# Patient Record
Sex: FEMALE | Race: BLACK OR AFRICAN AMERICAN | NOT HISPANIC OR LATINO | Employment: UNEMPLOYED | URBAN - METROPOLITAN AREA
[De-identification: names, ages, dates, MRNs, and addresses within clinical notes are randomized per-mention and may not be internally consistent; named-entity substitution may affect disease eponyms.]

---
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
## 2023-05-09 LAB
EXTERNAL GC/CHLAMYDIA: NEGATIVE
EXTERNAL GC/CHLAMYDIA: NEGATIVE
T VAGINALIS DNA VAG QL NAA+PROBE: NOT DETECTED
T VAGINALIS DNA VAG QL NAA+PROBE: NOT DETECTED

## 2024-01-24 ENCOUNTER — OFFICE VISIT (OUTPATIENT)
Dept: FAMILY MEDICINE CLINIC | Facility: CLINIC | Age: 24
End: 2024-01-24
Payer: MEDICAID

## 2024-01-24 VITALS
WEIGHT: 290.6 LBS | DIASTOLIC BLOOD PRESSURE: 78 MMHG | HEART RATE: 86 BPM | RESPIRATION RATE: 16 BRPM | BODY MASS INDEX: 46.7 KG/M2 | HEIGHT: 66 IN | TEMPERATURE: 97.5 F | SYSTOLIC BLOOD PRESSURE: 124 MMHG | OXYGEN SATURATION: 97 %

## 2024-01-24 DIAGNOSIS — F33.1 MODERATE EPISODE OF RECURRENT MAJOR DEPRESSIVE DISORDER: ICD-10-CM

## 2024-01-24 DIAGNOSIS — B37.2 CANDIDIASIS OF SKIN: ICD-10-CM

## 2024-01-24 DIAGNOSIS — E66.01 CLASS 3 SEVERE OBESITY DUE TO EXCESS CALORIES WITHOUT SERIOUS COMORBIDITY WITH BODY MASS INDEX (BMI) OF 45.0 TO 49.9 IN ADULT: ICD-10-CM

## 2024-01-24 DIAGNOSIS — F41.9 ANXIETY: ICD-10-CM

## 2024-01-24 DIAGNOSIS — Z00.00 WELLNESS EXAMINATION: Primary | ICD-10-CM

## 2024-01-24 DIAGNOSIS — E01.0 THYROMEGALY: ICD-10-CM

## 2024-01-24 DIAGNOSIS — E55.9 VITAMIN D DEFICIENCY: ICD-10-CM

## 2024-01-24 DIAGNOSIS — Z11.59 NEED FOR HEPATITIS C SCREENING TEST: ICD-10-CM

## 2024-01-24 PROBLEM — E66.813 CLASS 3 SEVERE OBESITY DUE TO EXCESS CALORIES WITHOUT SERIOUS COMORBIDITY WITH BODY MASS INDEX (BMI) OF 45.0 TO 49.9 IN ADULT: Status: ACTIVE | Noted: 2019-06-21

## 2024-01-24 RX ORDER — ESCITALOPRAM OXALATE 20 MG/1
20 TABLET ORAL DAILY
Qty: 90 TABLET | Refills: 0 | Status: SHIPPED | OUTPATIENT
Start: 2024-01-24

## 2024-01-24 RX ORDER — NYSTATIN 100000 U/G
1 CREAM TOPICAL 2 TIMES DAILY
Qty: 60 G | Refills: 0 | Status: SHIPPED | OUTPATIENT
Start: 2024-01-24

## 2024-01-24 RX ORDER — ACETAMINOPHEN AND CODEINE PHOSPHATE 120; 12 MG/5ML; MG/5ML
1 SOLUTION ORAL DAILY
COMMUNITY
Start: 2024-01-11

## 2024-01-24 RX ORDER — CETIRIZINE HYDROCHLORIDE 10 MG/1
10 TABLET ORAL DAILY PRN
COMMUNITY

## 2024-01-24 RX ORDER — ESCITALOPRAM OXALATE 10 MG/1
10 TABLET ORAL DAILY
COMMUNITY
Start: 2023-12-17 | End: 2024-01-24 | Stop reason: SDUPTHER

## 2024-01-24 NOTE — PATIENT INSTRUCTIONS
Go to [facility] (across the goode from office- [address]) for bloodwork.     The [facility] Scheduling Department will contact you to schedule ordered testing.  If you would like to schedule or verify your appointment, you can call [facility] Scheduling at [phone].

## 2024-01-24 NOTE — PROGRESS NOTES
Chief Complaint  Annual Exam, Depression, and Establish Care    Subjective          Glory is a 24 y.o. female  who presents to [facility]     Patient Care Team:  Mirela Jennings APRN as PCP - General (Family Medicine)  Samina Rivera MD as Consulting Physician (Obstetrics and Gynecology)     History of Present Illness  Glory is here today to establish care.  Previous PCP David Willett MD in [place], [place]  New patient      Adult Annual Wellness    Social History    Tobacco Use      Smoking status: Never      Smokeless tobacco: Never    Vaping Use      Vaping Use: Never used    Alcohol use: Yes      Comment: Rarely    Drug use: No    Caffeine use - coffee    General health habits  Last eye exam - 2017, she has an upcoming appointment, wears glasses  Last dental exam - 2017, she has an upcoming appointment    Diet - Regular diet    Weight / BMI - obese, BMI 46.2    Exercise - walking, treadmill  Cardio -yes   Weight lifting - yes    Hours of sleep per night ? 5    Safety -   Do you use seat belts consistently ? Yes  Working smoke detector in home ? Yes   Do you use sunscreen when outdoors?      Reproductive health -  Sexually active - yes  Birth control -  pill  History of abnormal pap smear? No     Screening/prevention  Last mammogram/ breast cancer screening - not applicable due to age  Last pap smear/ cervical cancer screening - she repots in May 2023, normal  Last DEXA scan - not applicable due to age  Colon cancer screening - not applicable due to age  CT low dose lung cancer screening - not applicable due to age    Immunizations -   Tdap - unsure  Shingles (Shingrix) - not applicable due to age     ++++++++++++++++++++++++     Patient presents for follow-up of depression and anxiety  This is an established problem  Interim treatment changes: started on escitalopram 10 mg daily by OB/GYN  Current medications: escitalopram 10 mg daily  Dx with postpartum  depression  She needs to get medication prescribed by her PCP.  She would like to increase the dose.  She feels it does not help as much as before.  Grandmother passed in Oct 2023  She has an appointment to establish care on 2/14/2024 with Vesta Yepez SI  PHQ-9 = 12  NAVNEET-7 = 10    She is breastfeeding  Breast pain bilaterally    She is worried about her weight  She has gained since delivery      Glory Boo  has a past medical history of Abnormal TSH, Alternating constipation and diarrhea, Anxiety, Arthralgia of both hands, Asthma, Breast lump, Depression, major, recurrent, mild, Enlarged thyroid, Migraine without intractable migraine, Mild intermittent asthma, uncomplicated, and Wears glasses.      Review of Systems   Constitutional:  Positive for fatigue and unexpected weight change. Negative for fever.   HENT:  Negative for ear pain, sore throat and trouble swallowing.    Eyes:  Negative for visual disturbance.   Respiratory:  Negative for cough and shortness of breath.    Cardiovascular:  Negative for chest pain.   Gastrointestinal:  Negative for abdominal pain, constipation, diarrhea, nausea and vomiting.        No heartburn   Endocrine: Negative for cold intolerance, heat intolerance, polydipsia, polyphagia and polyuria.   Genitourinary:  Negative for dysuria and hematuria.   Musculoskeletal:  Negative for arthralgias and myalgias.   Skin:  Negative for rash.   Allergic/Immunologic: Positive for environmental allergies.   Neurological:  Negative for dizziness and headaches.   Psychiatric/Behavioral:  Positive for dysphoric mood. Negative for suicidal ideas. The patient is nervous/anxious.         Family History   Problem Relation Age of Onset    Other Mother         psychiatric condition    Cervical cancer Mother     Hypertension Mother     Hyperlipidemia Maternal Grandmother     Hypertension Maternal Grandmother     Diabetes Maternal Grandmother         Diabetes Mellitus    Coronary artery disease  Maternal Grandmother     Cancer Maternal Great-Grandmother         Past Surgical History:   Procedure Laterality Date    NO PAST SURGERIES          Social History     Socioeconomic History    Marital status: Single    Number of children: 2    Highest education level: High school graduate   Tobacco Use    Smoking status: Never    Smokeless tobacco: Never   Vaping Use    Vaping Use: Never used   Substance and Sexual Activity    Alcohol use: Yes     Comment: Rarely    Drug use: No    Sexual activity: Yes     Partners: Male     Birth control/protection: Birth control pill        Immunization History   Administered Date(s) Administered    DTaP 2001, 2001, 2001, 10/12/2005, 2006    DTaP / HiB 10/09/2002    Flu Vaccine Quad PF 6-35MO 2015    Flu Vaccine Quad PF >36MO 10/12/2017    Fluzone Quad >6mos (Multi-dose) 10/12/2017    H1N1 Inj 2009, 2009    Hep A, 2 Dose 2018, 2019    Hep B, Adolescent or Pediatric 2001, 2001, 2001    Hib (PRP-T) 2001, 2001    Hpv9 2018, 2019    IPV 2001, 2001, 2001, 10/12/2005, 2006    Influenza LAIV (Nasal) 2010, 10/24/2012, 2014    MMR 2002, 10/12/2005    MMRV 2006    Meningococcal MCV4P (Menactra) 10/24/2012, 2018    PEDS-Pneumococcal Conjugate (PCV7) 2001, 2001, 2001, 2002    Tdap 10/24/2012    Trumenba(meningococcal B) 2018, 2019    Varicella 2002     Menstrual History:  OB History          3    Para   2    Term   2            AB   1    Living   2         SAB   1    IAB        Ectopic        Molar        Multiple        Live Births   2          Obstetric Comments   Menarche 12  Pap smear by Dr. Rivera              Menarche age: 12  No LMP recorded.       Objective       Current Outpatient Medications:     ALBUTEROL SULFATE  (90 Base) MCG/ACT inhaler, INHALE 2 PUFFS BY MOUTH EVERY 4 TO  "6 HOURS AS NEEDED -  MAX  12  PUFFS/DAY, Disp: 18 g, Rfl: 0    escitalopram (LEXAPRO) 20 MG tablet, Take 1 tablet by mouth Daily., Disp: 90 tablet, Rfl: 0    norethindrone (MICRONOR) 0.35 MG tablet, Take 1 tablet by mouth Daily., Disp: , Rfl:     cetirizine (zyrTEC) 10 MG tablet, Take 1 tablet by mouth Daily As Needed for Allergies., Disp: , Rfl:     nystatin (MYCOSTATIN) 209049 UNIT/GM cream, Apply 1 Application topically to the appropriate area as directed 2 (Two) Times a Day., Disp: 60 g, Rfl: 0    Vital Signs:      /78   Pulse 86   Temp 97.5 °F (36.4 °C) (Temporal)   Resp 16   Ht 168.9 cm (66.5\")   Wt 132 kg (290 lb 9.6 oz)   SpO2 97%   BMI 46.21 kg/m²     Vitals:    01/24/24 1444   BP: 124/78   Pulse: 86   Resp: 16   Temp: 97.5 °F (36.4 °C)   TempSrc: Temporal   SpO2: 97%   Weight: 132 kg (290 lb 9.6 oz)   Height: 168.9 cm (66.5\")   PainSc: 0-No pain      Physical Exam  Vitals reviewed.   Constitutional:       General: She is not in acute distress.     Appearance: Normal appearance. She is obese.   HENT:      Head: Normocephalic and atraumatic.      Right Ear: Tympanic membrane, ear canal and external ear normal.      Left Ear: Tympanic membrane, ear canal and external ear normal.      Nose: Nose normal.      Mouth/Throat:      Mouth: Mucous membranes are moist.      Pharynx: Oropharynx is clear.   Eyes:      General: No scleral icterus.     Conjunctiva/sclera: Conjunctivae normal.   Neck:      Thyroid: Thyromegaly present.   Cardiovascular:      Rate and Rhythm: Normal rate and regular rhythm.      Heart sounds: Normal heart sounds.   Pulmonary:      Effort: Pulmonary effort is normal. No respiratory distress.      Breath sounds: Normal breath sounds. No wheezing.   Chest:      Comments: Erythema around nipples bilaterally  Abdominal:      General: Bowel sounds are normal. There is no distension.      Palpations: Abdomen is soft. There is no mass.      Tenderness: There is no abdominal tenderness. " There is no right CVA tenderness, left CVA tenderness, guarding or rebound.   Musculoskeletal:      Cervical back: Neck supple.      Right lower leg: No edema.      Left lower leg: No edema.   Lymphadenopathy:      Cervical: No cervical adenopathy.   Skin:     General: Skin is warm and dry.   Neurological:      Mental Status: She is alert and oriented to person, place, and time.   Psychiatric:         Mood and Affect: Mood normal.         Behavior: Behavior normal.        Result Review :                PHQ-9 Depression Screening  Little interest or pleasure in doing things? 1-->several days   Feeling down, depressed, or hopeless? 2-->more than half the days   Trouble falling or staying asleep, or sleeping too much? 2-->more than half the days   Feeling tired or having little energy? 2-->more than half the days   Poor appetite or overeating? 2-->more than half the days   Feeling bad about yourself - or that you are a failure or have let yourself or your family down? 2-->more than half the days   Trouble concentrating on things, such as reading the newspaper or watching television? 1-->several days   Moving or speaking so slowly that other people could have noticed? Or the opposite - being so fidgety or restless that you have been moving around a lot more than usual? 0-->not at all   Thoughts that you would be better off dead, or of hurting yourself in some way? 0-->not at all   PHQ-9 Total Score 12   If you checked off any problems, how difficult have these problems made it for you to do your work, take care of things at home, or get along with other people? somewhat difficult       Over the last two weeks, how often have you been bothered by the following problems?  Feeling nervous, anxious or on edge: More than half the days  Not being able to stop or control worrying: More than half the days  Worrying too much about different things: More than half the days  Trouble Relaxing: More than half the days  Being so  restless that it is hard to sit still: Not at all  Becoming easily annoyed or irritable: More than half the days  Feeling afraid as if something awful might happen: Not at all  NAVNEET 7 Total Score: 10  If you checked any problems, how difficult have these problems made it for you to do your work, take care of things at home, or get along with other people: Somewhat difficult           Assessment and Plan    Diagnoses and all orders for this visit:    1. Wellness examination (Primary)  Assessment & Plan:  Discussed preventative healthcare.  Labs ordered. Recommended annual eye and dental exams. Recommended use of seatbelts, sunscreen and smoke detectors in the home.   Unsure if she had Tdap with recent pregnancy.  She reports cervical cancer screening (pap smear) was done by her OB/GYN.    Orders:  -     CBC & Differential  -     Comprehensive Metabolic Panel  -     Lipid Panel  -     TSH Rfx On Abnormal To Free T4    2. Moderate episode of recurrent major depressive disorder  Assessment & Plan:  Established problem.  Not controlled.   Increase escitalopram from 10 mg to 20 mg daily.    Advised if she begins to feel suicidal or if she has worsening symptoms of anxiety and/or depression, she should stop medicine and immediately call for follow-up appointment or seek emergency care.      Orders:  -     escitalopram (LEXAPRO) 20 MG tablet; Take 1 tablet by mouth Daily.  Dispense: 90 tablet; Refill: 0    3. Anxiety    4. Vitamin D deficiency  Overview:  Vitamin D 8 on 12/15/2020    Assessment & Plan:  Check Vitamin D level    Orders:  -     Vitamin D,25-Hydroxy    5. Thyromegaly  Comments:  Ordered thyroid US  Orders:  -     US Thyroid; Future    6. Candidiasis of skin  Comments:  Apply nystatin cream to affected areas  Orders:  -     nystatin (MYCOSTATIN) 545648 UNIT/GM cream; Apply 1 Application topically to the appropriate area as directed 2 (Two) Times a Day.  Dispense: 60 g; Refill: 0    7. Class 3 severe obesity due  to excess calories without serious comorbidity with body mass index (BMI) of 45.0 to 49.9 in adult  Assessment & Plan:  Patient's (Body mass index is 46.21 kg/m².) indicates that they are morbidly/severely obese (BMI > 40 or > 35 with obesity - related health condition) with health conditions that include none . Weight is newly identified. BMI  is above average; BMI management plan is completed. We discussed portion control, increasing exercise, and pharmacologic options including Wegovy .     Orders:  -     Hemoglobin A1c    8. Need for hepatitis C screening test  -     Hepatitis C Antibody             Follow Up   Return in about 6 weeks (around 3/6/2024) for anxiety/ depression.  Patient was given instructions and counseling regarding her condition or for health maintenance advice. Please see specific information pulled into the AVS if appropriate.    Patient Instructions   Go to [facility] (across the goode from office- [address]) for bloodwork.     The [facility] Scheduling Department will contact you to schedule ordered testing.  If you would like to schedule or verify your appointment, you can call [facility] Scheduling at [phone].

## 2024-01-28 PROBLEM — F33.1 MODERATE EPISODE OF RECURRENT MAJOR DEPRESSIVE DISORDER: Status: ACTIVE | Noted: 2024-01-28

## 2024-01-28 PROBLEM — E55.9 VITAMIN D DEFICIENCY: Status: ACTIVE | Noted: 2024-01-28

## 2024-01-28 NOTE — ASSESSMENT & PLAN NOTE
Discussed preventative healthcare.  Labs ordered. Recommended annual eye and dental exams. Recommended use of seatbelts, sunscreen and smoke detectors in the home.   Unsure if she had Tdap with recent pregnancy.  She reports cervical cancer screening (pap smear) was done by her OB/GYN.

## 2024-01-28 NOTE — ASSESSMENT & PLAN NOTE
Established problem.  Not controlled.   Increase escitalopram from 10 mg to 20 mg daily.    Advised if she begins to feel suicidal or if she has worsening symptoms of anxiety and/or depression, she should stop medicine and immediately call for follow-up appointment or seek emergency care.

## 2024-02-27 ENCOUNTER — OFFICE VISIT (OUTPATIENT)
Dept: FAMILY MEDICINE CLINIC | Facility: CLINIC | Age: 24
End: 2024-02-27
Payer: MEDICAID

## 2024-02-27 VITALS
WEIGHT: 293 LBS | DIASTOLIC BLOOD PRESSURE: 71 MMHG | TEMPERATURE: 97.3 F | BODY MASS INDEX: 47.09 KG/M2 | HEART RATE: 103 BPM | SYSTOLIC BLOOD PRESSURE: 121 MMHG | RESPIRATION RATE: 18 BRPM | HEIGHT: 66 IN

## 2024-02-27 DIAGNOSIS — N64.4 ACUTE BREAST PAIN: Primary | ICD-10-CM

## 2024-02-27 LAB
B-HCG UR QL: NEGATIVE
EXPIRATION DATE: NORMAL
INTERNAL NEGATIVE CONTROL: NEGATIVE
INTERNAL POSITIVE CONTROL: POSITIVE
Lab: NORMAL

## 2024-02-27 RX ORDER — CLINDAMYCIN HYDROCHLORIDE 300 MG/1
300 CAPSULE ORAL 3 TIMES DAILY
Qty: 30 CAPSULE | Refills: 0 | Status: SHIPPED | OUTPATIENT
Start: 2024-02-27 | End: 2024-03-08

## 2024-02-27 NOTE — PROGRESS NOTES
Chief Complaint  Breast Pain (Right Breast.  Pain in upper breast radiating into arm pit)    Subjective          Glory Boo presents to [facility] for breast pain    Breast Pain  Pertinent negatives include no fatigue or fever.       Patient is here complaining of breast pain in the right breast.  She has had this for about a week.  It is similar to when she had a previous episode of breast pain.  She has been lactating for about 9 months.  She reports before that she had breast-fed for about a year and a half to her first baby.  She had an ultrasound previously when she had some pain and tenderness in her upper breast that extended into her armpit.  When this started again she did have some areas that wore patchy and erythematous.  She did not feel heat or warmth in her breast.  She has no fever nausea or vomiting or systemic symptoms.  Patient stopped breast-feeding 5 days ago.  Glory Boo  has a past medical history of Abnormal TSH, Alternating constipation and diarrhea, Anxiety, Arthralgia of both hands, Asthma, Breast lump, Depression, major, recurrent, mild, Enlarged thyroid, Migraine without intractable migraine, Mild intermittent asthma, uncomplicated, and Wears glasses.      Review of Systems   Constitutional:  Negative for fatigue and fever.   Skin:         Breast pain with occasional erythema        Objective       Current Outpatient Medications:     ALBUTEROL SULFATE  (90 Base) MCG/ACT inhaler, INHALE 2 PUFFS BY MOUTH EVERY 4 TO 6 HOURS AS NEEDED -  MAX  12  PUFFS/DAY, Disp: 18 g, Rfl: 0    cetirizine (zyrTEC) 10 MG tablet, Take 1 tablet by mouth Daily As Needed for Allergies., Disp: , Rfl:     escitalopram (LEXAPRO) 20 MG tablet, Take 1 tablet by mouth Daily., Disp: 90 tablet, Rfl: 0    norethindrone (MICRONOR) 0.35 MG tablet, Take 1 tablet by mouth Daily., Disp: , Rfl:     nystatin (MYCOSTATIN) 153887 UNIT/GM cream, Apply 1 Application topically to the  "appropriate area as directed 2 (Two) Times a Day., Disp: 60 g, Rfl: 0    clindamycin (CLEOCIN) 300 MG capsule, Take 1 capsule by mouth 3 (Three) Times a Day for 10 days., Disp: 30 capsule, Rfl: 0    Vital Signs:      /71   Pulse 103   Temp 97.3 °F (36.3 °C) (Temporal)   Resp 18   Ht 168.9 cm (66.5\")   Wt 133 kg (293 lb)   BMI 46.59 kg/m²     Vitals:    02/27/24 1424   BP: 121/71   Pulse: 103   Resp: 18   Temp: 97.3 °F (36.3 °C)   TempSrc: Temporal   Weight: 133 kg (293 lb)   Height: 168.9 cm (66.5\")      Physical Exam  Vitals and nursing note reviewed.   Chest:   Breasts:     Right: Nipple discharge present. No bleeding, inverted nipple or mass.      Left: Nipple discharge present. No bleeding, inverted nipple or mass.      Comments: Right breast with some faint areas of patchy erythema.  No lymph nodes are masses found.    Nipple discharge breast milk.  Skin:     General: Skin is warm and dry.   Neurological:      Mental Status: She is alert.        Result Review :                    Discussed clindamycin side effects.  No longer breast-feeding.       Assessment and Plan    Diagnoses and all orders for this visit:    1. Acute breast pain (Primary)  Comments:  US ordered.  No obvious abscesses.  No apparent mastitis.  Will start antibiotic.  Pregnancy test urine negative.  Will call with results mammogram if needed  Orders:  -     US Breast Right Limited; Future  -     Cancel: CBC Auto Differential  -     hCG, Serum, Qualitative  -     Cancel: Mammo Screening Modified With Tomosynthesis Right With CAD; Future  -     POC Pregnancy, Urine  -     Mammo Screening Modified With Tomosynthesis Right With CAD; Future    2. Lactating mother  Comments:  Ending breast-feeding.  Patient stopped 5 days ago.  Orders:  -     US Breast Right Limited; Future    Other orders  -     clindamycin (CLEOCIN) 300 MG capsule; Take 1 capsule by mouth 3 (Three) Times a Day for 10 days.  Dispense: 30 capsule; Refill: 0     "     Problem List Items Addressed This Visit          Active Problems    Acute breast pain - Primary    Relevant Orders    US Breast Right Limited    hCG, Serum, Qualitative    POC Pregnancy, Urine (Completed)    Mammo Screening Modified With Tomosynthesis Right With CAD    Lactating mother    Relevant Orders    US Breast Right Limited       Follow Up   Return if symptoms worsen or fail to improve.  Patient was given instructions and counseling regarding her condition or for health maintenance advice. Please see specific information pulled into the AVS if appropriate.

## 2024-02-28 ENCOUNTER — HOSPITAL ENCOUNTER (OUTPATIENT)
Dept: ULTRASOUND IMAGING | Facility: HOSPITAL | Age: 24
Discharge: HOME | End: 2024-02-28
Admitting: NURSE PRACTITIONER
Payer: MEDICAID

## 2024-02-28 DIAGNOSIS — E01.0 THYROMEGALY: ICD-10-CM

## 2024-02-28 PROCEDURE — 76536 US EXAM OF HEAD AND NECK: CPT

## 2024-02-29 LAB
25(OH)D3+25(OH)D2 SERPL-MCNC: 22.8 NG/ML (ref 30–100)
ALBUMIN SERPL-MCNC: 4.2 G/DL (ref 4–5)
ALBUMIN/GLOB SERPL: 1.3 {RATIO} (ref 1.2–2.2)
ALP SERPL-CCNC: 103 IU/L (ref 44–121)
ALT SERPL-CCNC: 29 IU/L (ref 0–32)
AST SERPL-CCNC: 16 IU/L (ref 0–40)
B-HCG SERPL QL: NEGATIVE MIU/ML
BASOPHILS # BLD AUTO: 0 X10E3/UL (ref 0–0.2)
BASOPHILS NFR BLD AUTO: 1 %
BILIRUB SERPL-MCNC: <0.2 MG/DL (ref 0–1.2)
BUN SERPL-MCNC: 15 MG/DL (ref 6–20)
BUN/CREAT SERPL: 25 (ref 9–23)
CALCIUM SERPL-MCNC: 9.7 MG/DL (ref 8.7–10.2)
CHLORIDE SERPL-SCNC: 101 MMOL/L (ref 96–106)
CHOLEST SERPL-MCNC: 241 MG/DL (ref 100–199)
CO2 SERPL-SCNC: 25 MMOL/L (ref 20–29)
CREAT SERPL-MCNC: 0.61 MG/DL (ref 0.57–1)
EGFRCR SERPLBLD CKD-EPI 2021: 129 ML/MIN/1.73
EOSINOPHIL # BLD AUTO: 0.2 X10E3/UL (ref 0–0.4)
EOSINOPHIL NFR BLD AUTO: 3 %
ERYTHROCYTE [DISTWIDTH] IN BLOOD BY AUTOMATED COUNT: 12.9 % (ref 11.7–15.4)
GLOBULIN SER CALC-MCNC: 3.2 G/DL (ref 1.5–4.5)
GLUCOSE SERPL-MCNC: 74 MG/DL (ref 70–99)
HBA1C MFR BLD: 5.8 % (ref 4.8–5.6)
HCT VFR BLD AUTO: 40.7 % (ref 34–46.6)
HCV IGG SERPL QL IA: NON REACTIVE
HDLC SERPL-MCNC: 44 MG/DL
HGB BLD-MCNC: 13.6 G/DL (ref 11.1–15.9)
IMM GRANULOCYTES # BLD AUTO: 0 X10E3/UL (ref 0–0.1)
IMM GRANULOCYTES NFR BLD AUTO: 0 %
LDLC SERPL CALC-MCNC: 173 MG/DL (ref 0–99)
LYMPHOCYTES # BLD AUTO: 2.9 X10E3/UL (ref 0.7–3.1)
LYMPHOCYTES NFR BLD AUTO: 45 %
MCH RBC QN AUTO: 29.1 PG (ref 26.6–33)
MCHC RBC AUTO-ENTMCNC: 33.4 G/DL (ref 31.5–35.7)
MCV RBC AUTO: 87 FL (ref 79–97)
MONOCYTES # BLD AUTO: 0.3 X10E3/UL (ref 0.1–0.9)
MONOCYTES NFR BLD AUTO: 5 %
NEUTROPHILS # BLD AUTO: 3 X10E3/UL (ref 1.4–7)
NEUTROPHILS NFR BLD AUTO: 46 %
PLATELET # BLD AUTO: 382 X10E3/UL (ref 150–450)
POTASSIUM SERPL-SCNC: 4.5 MMOL/L (ref 3.5–5.2)
PROT SERPL-MCNC: 7.4 G/DL (ref 6–8.5)
RBC # BLD AUTO: 4.68 X10E6/UL (ref 3.77–5.28)
SODIUM SERPL-SCNC: 141 MMOL/L (ref 134–144)
TRIGL SERPL-MCNC: 133 MG/DL (ref 0–149)
TSH SERPL DL<=0.005 MIU/L-ACNC: 0.59 UIU/ML (ref 0.45–4.5)
VLDLC SERPL CALC-MCNC: 24 MG/DL (ref 5–40)
WBC # BLD AUTO: 6.4 X10E3/UL (ref 3.4–10.8)

## 2024-03-01 DIAGNOSIS — E55.9 VITAMIN D DEFICIENCY: Primary | ICD-10-CM

## 2024-03-01 RX ORDER — ERGOCALCIFEROL 1.25 MG/1
50000 CAPSULE ORAL WEEKLY
Qty: 12 CAPSULE | Refills: 1 | Status: SHIPPED | OUTPATIENT
Start: 2024-03-01

## 2024-03-02 NOTE — PROGRESS NOTES
Let her know her thyroid ultrasound shows:    1.  Left inferior pole nodule measuring 1.7 cm.    2. Mildly enlarged thyroid gland.     3. Repeat thyroid ultrasound in 1 year.

## 2024-04-13 DIAGNOSIS — F33.1 MODERATE EPISODE OF RECURRENT MAJOR DEPRESSIVE DISORDER: ICD-10-CM

## 2024-04-14 RX ORDER — ESCITALOPRAM OXALATE 20 MG/1
20 TABLET ORAL DAILY
Qty: 90 TABLET | Refills: 0 | Status: SHIPPED | OUTPATIENT
Start: 2024-04-14

## 2024-04-25 ENCOUNTER — HOSPITAL ENCOUNTER (OUTPATIENT)
Dept: GENERAL RADIOLOGY | Facility: HOSPITAL | Age: 24
Discharge: HOME | End: 2024-04-25
Payer: MEDICAID

## 2024-04-25 ENCOUNTER — OFFICE VISIT (OUTPATIENT)
Dept: FAMILY MEDICINE CLINIC | Facility: CLINIC | Age: 24
End: 2024-04-25
Payer: MEDICAID

## 2024-04-25 VITALS
HEART RATE: 91 BPM | SYSTOLIC BLOOD PRESSURE: 127 MMHG | TEMPERATURE: 97.4 F | DIASTOLIC BLOOD PRESSURE: 84 MMHG | RESPIRATION RATE: 20 BRPM | OXYGEN SATURATION: 97 % | HEIGHT: 66 IN | BODY MASS INDEX: 47.09 KG/M2 | WEIGHT: 293 LBS

## 2024-04-25 DIAGNOSIS — J30.89 NON-SEASONAL ALLERGIC RHINITIS, UNSPECIFIED TRIGGER: ICD-10-CM

## 2024-04-25 DIAGNOSIS — J45.20 MILD INTERMITTENT ASTHMA, UNCOMPLICATED: ICD-10-CM

## 2024-04-25 DIAGNOSIS — F41.9 ANXIETY: ICD-10-CM

## 2024-04-25 DIAGNOSIS — F33.2 SEVERE EPISODE OF RECURRENT MAJOR DEPRESSIVE DISORDER, WITHOUT PSYCHOTIC FEATURES: ICD-10-CM

## 2024-04-25 DIAGNOSIS — M25.562 ACUTE PAIN OF LEFT KNEE: Primary | ICD-10-CM

## 2024-04-25 PROCEDURE — 99214 OFFICE O/P EST MOD 30 MIN: CPT | Performed by: NURSE PRACTITIONER

## 2024-04-25 PROCEDURE — 71046 X-RAY EXAM CHEST 2 VIEWS: CPT

## 2024-04-25 PROCEDURE — 1160F RVW MEDS BY RX/DR IN RCRD: CPT | Performed by: NURSE PRACTITIONER

## 2024-04-25 PROCEDURE — 1159F MED LIST DOCD IN RCRD: CPT | Performed by: NURSE PRACTITIONER

## 2024-04-25 PROCEDURE — 73562 X-RAY EXAM OF KNEE 3: CPT

## 2024-04-25 RX ORDER — VENLAFAXINE HYDROCHLORIDE 75 MG/1
75 CAPSULE, EXTENDED RELEASE ORAL DAILY
Qty: 30 CAPSULE | Refills: 1 | Status: SHIPPED | OUTPATIENT
Start: 2024-04-25

## 2024-04-25 RX ORDER — FLUTICASONE PROPIONATE AND SALMETEROL 100; 50 UG/1; UG/1
1 POWDER RESPIRATORY (INHALATION)
Qty: 30 EACH | Refills: 12 | Status: SHIPPED | OUTPATIENT
Start: 2024-04-25

## 2024-04-25 RX ORDER — MONTELUKAST SODIUM 10 MG/1
10 TABLET ORAL NIGHTLY
Qty: 90 TABLET | Refills: 1 | Status: SHIPPED | OUTPATIENT
Start: 2024-04-25

## 2024-04-25 NOTE — PROGRESS NOTES
Chief Complaint  Knee Pain (Fall 2 to 3 weeks ago.  Knot on left knee), Depression, Anxiety, and Asthma    Subjective          Glory is a 24 y.o. female  who presents to [facility]     Patient Care Team:  Mirela Jennings APRN as PCP - General (Family Medicine)  Nicole, Samina Vang MD as Consulting Physician (Obstetrics and Gynecology)     History of Present Illness  Glory is here today for knee pain    Location: Left knee pain  Quality: throbbing  Severity: mild to moderate  Duration: for 2 - 3 weeks  Timing: constant  Context:  she slipped and landed on left knee about 2-3 weeks ago. Left knee was swollen after fall.  Swelling is now gone but she has a knot at site of fall.   Alleviating factors: resting  Aggravating factors: walking, touching the knot on her knee  Associated Symptoms: + pain with walking    ++++++++++++++++++++++++    Patient presents for follow-up of depression and anxiety  This is an established problem  Interim treatment changes: Increased escitalopram from 10 mg to 20 mg daily on 1/24/2024.  She stopped taking escitalopram 5 days ago because it does not work.  Current medications: none  Dx with postpartum depression    Suicidal feelings  No plan or intent  States she does not want to die    Grandmother passed in Oct 2023    She established care on 2/14/2024 with [facility]  [facility].  Last visit was last week.     PHQ-9 = 24 on 4/25/2024  Was 12 on 1/24/2024    NAVNEET-7 = 15 on 4/25/2024  Was 10 on 1/24/2024    Previous medications:  - Lexapro - did not help  - Zoloft - made her feel like a zombie    ++++++++++++++++++++++++    Her  wants her to talk about her lungs  She sometimes feels like her left lung is stuck.  Hard to take a deep breath.  She will take short breaths and she will feel a pop and then her lung will expand  Hx asthma  Uses albuterol - every other day  Used to take Advair but no current  maintenance inhaler.  Asthma Control Test = 19    Glory Boo  has a past medical history of Abnormal TSH, Alternating constipation and diarrhea, Anxiety, Arthralgia of both hands, Asthma, Breast lump, Depression, major, recurrent, mild, Enlarged thyroid, Migraine without intractable migraine, Mild intermittent asthma, uncomplicated, and Wears glasses.      Review of Systems   Constitutional:  Positive for fever and unexpected weight change (gain 9 lbs).   HENT:  Negative for ear pain and sore throat.    Respiratory:  Positive for shortness of breath. Negative for cough.    Cardiovascular:  Negative for chest pain.   Gastrointestinal:  Negative for abdominal pain, nausea and vomiting.   Musculoskeletal:  Positive for arthralgias (knee pain).   Psychiatric/Behavioral:  Positive for dysphoric mood and sleep disturbance. Negative for suicidal ideas. The patient is nervous/anxious.         Family History   Problem Relation Age of Onset    Other Mother         psychiatric condition    Cervical cancer Mother     Hypertension Mother     Hyperlipidemia Maternal Grandmother     Hypertension Maternal Grandmother     Diabetes Maternal Grandmother         Diabetes Mellitus    Coronary artery disease Maternal Grandmother     Cancer Maternal Great-Grandmother         Past Surgical History:   Procedure Laterality Date    NO PAST SURGERIES          Social History     Socioeconomic History    Marital status: Single    Number of children: 2    Highest education level: High school graduate   Tobacco Use    Smoking status: Never    Smokeless tobacco: Never   Vaping Use    Vaping status: Never Used   Substance and Sexual Activity    Alcohol use: Yes     Comment: Rarely    Drug use: No    Sexual activity: Yes     Partners: Male     Birth control/protection: Birth control pill        Immunization History   Administered Date(s) Administered    DTaP 2001, 2001, 2001, 10/12/2005, 08/09/2006    DTaP / HiB 10/09/2002     "Flu Vaccine Quad PF 6-35MO 01/13/2015    Flu Vaccine Quad PF >36MO 10/12/2017    H1N1 Inj 11/16/2009, 12/16/2009    Hep A, 2 Dose 08/23/2018, 04/26/2019    Hep B, Adolescent or Pediatric 2001, 2001, 2001    Hib (PRP-T) 2001, 2001    Hpv9 08/23/2018, 04/26/2019    IPV 2001, 2001, 2001, 10/12/2005, 08/09/2006    Influenza LAIV (Nasal) 09/23/2010, 10/24/2012, 02/04/2014    MMR 01/04/2002, 10/12/2005    MMRV 08/09/2006    Meningococcal MCV4P (Menactra) 10/24/2012, 08/23/2018    PEDS-Pneumococcal Conjugate (PCV7) 2001, 2001, 2001, 01/04/2002    Tdap 10/24/2012    Trumenba(meningococcal B) 08/23/2018, 04/26/2019    Varicella 01/04/2002       Objective       Current Outpatient Medications:     ALBUTEROL SULFATE  (90 Base) MCG/ACT inhaler, INHALE 2 PUFFS BY MOUTH EVERY 4 TO 6 HOURS AS NEEDED -  MAX  12  PUFFS/DAY, Disp: 18 g, Rfl: 0    cetirizine (zyrTEC) 10 MG tablet, Take 1 tablet by mouth Daily As Needed for Allergies., Disp: , Rfl:     norethindrone (MICRONOR) 0.35 MG tablet, Take 1 tablet by mouth Daily., Disp: , Rfl:     VITAMIN D PO, Take  by mouth. Pt taking OTC Gummies, Disp: , Rfl:     Fluticasone-Salmeterol (ADVAIR/WIXELA) 100-50 MCG/ACT DISKUS, Inhale 1 puff 2 (Two) Times a Day. Rinse mouth after using, Disp: 30 each, Rfl: 12    montelukast (Singulair) 10 MG tablet, Take 1 tablet by mouth Every Night., Disp: 90 tablet, Rfl: 1    venlafaxine XR (EFFEXOR-XR) 75 MG 24 hr capsule, Take 1 capsule by mouth Daily., Disp: 30 capsule, Rfl: 1    Vital Signs:      /84   Pulse 91   Temp 97.4 °F (36.3 °C) (Temporal)   Resp 20   Ht 168.9 cm (66.5\")   Wt 136 kg (299 lb 6.4 oz)   SpO2 97%   BMI 47.61 kg/m²     Vitals:    04/25/24 1008   BP: 127/84   Pulse: 91   Resp: 20   Temp: 97.4 °F (36.3 °C)   TempSrc: Temporal   SpO2: 97%   Weight: 136 kg (299 lb 6.4 oz)   Height: 168.9 cm (66.5\")      Physical Exam  Vitals reviewed.   Constitutional:     "   General: She is not in acute distress.     Appearance: Normal appearance. She is obese.   HENT:      Head: Normocephalic and atraumatic.      Right Ear: Tympanic membrane, ear canal and external ear normal.      Left Ear: Tympanic membrane, ear canal and external ear normal.      Mouth/Throat:      Mouth: Mucous membranes are moist.      Pharynx: Oropharynx is clear.   Eyes:      General: No scleral icterus.     Conjunctiva/sclera: Conjunctivae normal.   Cardiovascular:      Rate and Rhythm: Normal rate and regular rhythm.      Heart sounds: Normal heart sounds.   Pulmonary:      Effort: Pulmonary effort is normal. No respiratory distress.      Breath sounds: Normal breath sounds.   Musculoskeletal:      Cervical back: Neck supple.      Left knee: No swelling. Normal range of motion. Tenderness present.      Right lower leg: No edema.      Left lower leg: No edema.        Legs:    Lymphadenopathy:      Cervical: No cervical adenopathy.   Skin:     General: Skin is warm and dry.   Neurological:      Mental Status: She is alert and oriented to person, place, and time.   Psychiatric:         Mood and Affect: Mood normal.         Behavior: Behavior normal.        Result Review :   The following data was reviewed by: ALEXX Augustin on 04/25/2024:    PHQ-9 Depression Screening  Little interest or pleasure in doing things? 2-->more than half the days   Feeling down, depressed, or hopeless? 3-->nearly every day   Trouble falling or staying asleep, or sleeping too much? 3-->nearly every day   Feeling tired or having little energy? 3-->nearly every day   Poor appetite or overeating? 3-->nearly every day   Feeling bad about yourself - or that you are a failure or have let yourself or your family down? 3-->nearly every day   Trouble concentrating on things, such as reading the newspaper or watching television? 3-->nearly every day   Moving or speaking so slowly that other people could have noticed? Or the opposite -  being so fidgety or restless that you have been moving around a lot more than usual? 2-->more than half the days   Thoughts that you would be better off dead, or of hurting yourself in some way? 2-->more than half the days   PHQ-9 Total Score 24   If you checked off any problems, how difficult have these problems made it for you to do your work, take care of things at home, or get along with other people?           Over the last two weeks, how often have you been bothered by the following problems?  Feeling nervous, anxious or on edge: More than half the days  Not being able to stop or control worrying: More than half the days  Worrying too much about different things: More than half the days  Trouble Relaxing: Nearly every day  Being so restless that it is hard to sit still: Not at all  Becoming easily annoyed or irritable: Nearly every day  Feeling afraid as if something awful might happen: Nearly every day  NAVNEET 7 Total Score: 15  If you checked any problems, how difficult have these problems made it for you to do your work, take care of things at home, or get along with other people: Very difficult      ++++++++++++++++++++++++    Asthma Control Test = Total 19    1. In the past 4 weeks, how much of the time did your asthma keep you from getting as much   done at work, school or at home?  All of the time [1]   Most of the time [2]  Some of the time [3]  A little of the time [4] XXX  None of the time [5]    2. During the past 4 weeks, how often have you had shortness of breath?  More than Once a day [1]  Once a day [2]  3 to 6 times a week [3] XXX  Once or twice a week [4]  Not at all [5]    3. During the past 4 weeks, how often did your asthma symptoms (wheezing, coughing, shortness   of breath, chest tightness or pain) wake you up at night or earlier than usual in the morning?  4 or more nights a week [1]  2 to 3 nights a week [2]  Once a week [3]  Once or twice [4] XXX  Not at all [5]    4. During the past 4  weeks, how often have you used your rescue inhaler or nebulizer medication   (such as albuterol)?  3 or more times per day [1]  1 or 2 times per day [2]  2 or 3 times per week [3]  Once a week or less [4] XXX  Not at all [5]    5. How would you rate your asthma control during the past 4 weeks?  Not Controlled at All [1]  Poorly Controlled [2]  Somewhat  Controlled [3]  Well  Controlled [4] XXX  Completely Controlled [5]         Assessment and Plan    Diagnoses and all orders for this visit:    1. Acute pain of left knee (Primary)  Comments:  Ordered knee xray  Orders:  -     XR Knee 3 View Left    2. Severe episode of recurrent major depressive disorder, without psychotic features  Assessment & Plan:  Established problem.  Not controlled.   Begin venlafaxine XR 75 mg daily    [facility].  Refer to psychiatry.    Advised if she begins to feel suicidal or if she has worsening symptoms of anxiety and/or depression, she should stop medicine and immediately call for follow-up appointment or seek emergency care.    Orders:  -     venlafaxine XR (EFFEXOR-XR) 75 MG 24 hr capsule; Take 1 capsule by mouth Daily.  Dispense: 30 capsule; Refill: 1  -     Ambulatory Referral to Psychiatry    3. Anxiety  Assessment & Plan:  Established problem.  Not controlled.   Begin venlafaxine XR 75 mg daily    Continue follow up with therapist at [facility].  Refer to psychiatry.    Advised if she begins to feel suicidal or if she has worsening symptoms of anxiety and/or depression, she should stop medicine and immediately call for follow-up appointment or seek emergency care.    Orders:  -     Ambulatory Referral to Psychiatry    4. Mild intermittent asthma, uncomplicated  Assessment & Plan:  Asthma Control Test = 19    Begin Advair 100/50 1 puff BID and montelukast 10 mg at bedtime.  Ordered chest xray  Refer to [facility]    Orders:  -     XR Chest PA & Lateral  -     montelukast  (Singulair) 10 MG tablet; Take 1 tablet by mouth Every Night.  Dispense: 90 tablet; Refill: 1  -     Fluticasone-Salmeterol (ADVAIR/WIXELA) 100-50 MCG/ACT DISKUS; Inhale 1 puff 2 (Two) Times a Day. Rinse mouth after using  Dispense: 30 each; Refill: 12  -     Ambulatory Referral to Allergy    5. Non-seasonal allergic rhinitis, unspecified trigger  Assessment & Plan:  Continue Cetirizine 10 mg at bedtime    Orders:  -     Ambulatory Referral to Allergy             Follow Up   Return in about 4 weeks (around 5/23/2024) for follow up depression/anxiety.  Patient was given instructions and counseling regarding her condition or for health maintenance advice. Please see specific information pulled into the AVS if appropriate.    There are no Patient Instructions on file for this visit.

## 2024-04-26 PROBLEM — J30.89 NON-SEASONAL ALLERGIC RHINITIS: Status: ACTIVE | Noted: 2024-04-26

## 2024-04-27 NOTE — ASSESSMENT & PLAN NOTE
Established problem.  Not controlled.   Begin venlafaxine XR 75 mg daily    Continue follow up with therapist at [facility].  Refer to psychiatry.    Advised if she begins to feel suicidal or if she has worsening symptoms of anxiety and/or depression, she should stop medicine and immediately call for follow-up appointment or seek emergency care.

## 2024-04-27 NOTE — ASSESSMENT & PLAN NOTE
Established problem.  Not controlled.   Begin venlafaxine XR 75 mg daily    Continue follow up with therapist at [facility] ([place], [place]).  Refer to psychiatry.    Advised if she begins to feel suicidal or if she has worsening symptoms of anxiety and/or depression, she should stop medicine and immediately call for follow-up appointment or seek emergency care.

## 2024-04-27 NOTE — ASSESSMENT & PLAN NOTE
Asthma Control Test = 19    Begin Advair 100/50 1 puff BID and montelukast 10 mg at bedtime.  Ordered chest xray  Refer to [facility]

## 2024-05-28 ENCOUNTER — HOSPITAL ENCOUNTER (EMERGENCY)
Facility: HOSPITAL | Age: 24
Discharge: HOME | End: 2024-05-28
Admitting: EMERGENCY MEDICINE
Payer: MEDICAID

## 2024-05-28 VITALS
HEART RATE: 100 BPM | RESPIRATION RATE: 20 BRPM | HEIGHT: 67 IN | TEMPERATURE: 98 F | OXYGEN SATURATION: 99 % | SYSTOLIC BLOOD PRESSURE: 142 MMHG | WEIGHT: 293 LBS | BODY MASS INDEX: 45.99 KG/M2 | DIASTOLIC BLOOD PRESSURE: 88 MMHG

## 2024-05-28 DIAGNOSIS — J45.20 MILD INTERMITTENT ASTHMA, UNCOMPLICATED: ICD-10-CM

## 2024-05-28 DIAGNOSIS — U07.1 COVID-19 VIRUS DETECTED: Primary | ICD-10-CM

## 2024-05-28 LAB
FLUAV SUBTYP SPEC NAA+PROBE: NOT DETECTED
FLUBV RNA ISLT QL NAA+PROBE: NOT DETECTED
SARS-COV-2 RNA RESP QL NAA+PROBE: DETECTED

## 2024-05-28 PROCEDURE — 87636 SARSCOV2 & INF A&B AMP PRB: CPT | Performed by: PHYSICIAN ASSISTANT

## 2024-05-28 PROCEDURE — 99283 EMERGENCY DEPT VISIT LOW MDM: CPT

## 2024-05-29 NOTE — ED PROVIDER NOTES
Subjective   Provider in Triage Note  Patient is a 24-year-old female presents to the ED with reports of cough congestion started yesterday.  No reports of nausea vomiting or diarrhea. patient's son has similar symptoms. no chest pain or shortness of breath.    Due to significant overcrowding in the emergency department patient was initially seen and evaluated in triage.  Provider in triage recommended patient placed in treatment area to initiate therapy and movement to an ER bed as soon as possible.        History of Present Illness  See Pit note.        Review of Systems   Constitutional:  Positive for chills. Negative for fatigue and fever.   HENT:  Positive for congestion and rhinorrhea. Negative for ear discharge, ear pain, facial swelling, sinus pressure, sinus pain, sore throat, trouble swallowing and voice change.    Eyes:  Negative for photophobia and visual disturbance.   Respiratory:  Positive for cough. Negative for apnea, choking, chest tightness, shortness of breath, wheezing and stridor.    Cardiovascular: Negative.    Gastrointestinal:  Negative for abdominal distention, abdominal pain, nausea and vomiting.   Musculoskeletal:  Negative for neck pain and neck stiffness.   Skin: Negative.    Neurological:  Negative for headaches.       Past Medical History:   Diagnosis Date    Abnormal TSH     Alternating constipation and diarrhea     Impression: check labs as below. Most likely IBS. Discussed diet and things to avoid. Start probiotics. F/U in 4 days    Anxiety     Arthralgia of both hands     Impression: Rheumatoid labs negative. She may need to see rheumatologist.    Asthma     Breast lump     Impression: u/s was negative last year. Exam negative again. She says she has not felt it either.    Depression, major, recurrent, mild     Impression: She is seeing a counsler. She is going to take zoloft 1/2 a tab unless she needs the 100mg. No SI or HI    Enlarged thyroid     Impression: recheck tsh     Migraine without intractable migraine     Common migraine without mention of intractable migraine; Impression: Discussed if there is loss of vision in an eye, confusion, weakness or numbness in an extremity, drooping of a side of the face, intractible pain, intractible vomiting, to go to the ER. much better    Mild intermittent asthma, uncomplicated     Impression: refill rescue and add singulair. Diagnosis, treatment and course discussed. Discussed medication, dosing and adverse effects. Return if there is worsening or persistance of symptoms    Wears glasses        Allergies   Allergen Reactions    Cephalexin Hives       Past Surgical History:   Procedure Laterality Date    NO PAST SURGERIES         Family History   Problem Relation Age of Onset    Other Mother         psychiatric condition    Cervical cancer Mother     Hypertension Mother     Hyperlipidemia Maternal Grandmother     Hypertension Maternal Grandmother     Diabetes Maternal Grandmother         Diabetes Mellitus    Coronary artery disease Maternal Grandmother     Cancer Maternal Great-Grandmother        Social History     Socioeconomic History    Marital status: Single    Number of children: 2    Highest education level: High school graduate   Tobacco Use    Smoking status: Never    Smokeless tobacco: Never   Vaping Use    Vaping status: Never Used   Substance and Sexual Activity    Alcohol use: Yes     Comment: Rarely    Drug use: No    Sexual activity: Yes     Partners: Male     Birth control/protection: Birth control pill           Objective   Physical Exam  Vitals and nursing note reviewed.   Constitutional:       General: She is not in acute distress.     Appearance: She is well-developed. She is obese. She is not ill-appearing, toxic-appearing or diaphoretic.   HENT:      Head: Normocephalic and atraumatic.      Nose: Nose normal.      Mouth/Throat:      Mouth: Mucous membranes are moist.      Pharynx: Oropharynx is clear. Uvula midline. No  "pharyngeal swelling, oropharyngeal exudate, posterior oropharyngeal erythema or uvula swelling.      Tonsils: No tonsillar exudate or tonsillar abscesses.   Eyes:      General: No scleral icterus.     Extraocular Movements: Extraocular movements intact.      Pupils: Pupils are equal, round, and reactive to light.   Cardiovascular:      Rate and Rhythm: Normal rate and regular rhythm.      Pulses: Normal pulses.      Heart sounds: No murmur heard.     No friction rub. No gallop.   Pulmonary:      Effort: Pulmonary effort is normal. No respiratory distress.      Breath sounds: Normal breath sounds. No stridor. No wheezing, rhonchi or rales.   Chest:      Chest wall: No tenderness.   Skin:     General: Skin is warm.      Capillary Refill: Capillary refill takes less than 2 seconds.      Coloration: Skin is not cyanotic, jaundiced or pale.      Findings: No rash.   Neurological:      General: No focal deficit present.      Mental Status: She is alert and oriented to person, place, and time.   Psychiatric:         Mood and Affect: Mood normal.         Behavior: Behavior normal.         Procedures           ED Course  ED Course as of 05/28/24 2336 Tue May 28, 2024   2330 Patient reports she is currently only taking Advair, Effexor, and montelukast.  Drug interaction was done by Nicky and pharmacy recommending holding Advair while taking Paxlovid. [AA]      ED Course User Index  [AA] Arelis King PA      /88   Pulse 100   Temp 98.5 °F (36.9 °C)   Resp 18   Ht 170.2 cm (67\")   Wt 134 kg (296 lb 1.2 oz)   LMP 08/15/2023 (Approximate) Comment: Last period was August- Just got off birth control 2 months ago.  SpO2 97%   BMI 46.37 kg/m²   Medications - No data to display  Labs Reviewed   COVID-19 AND FLU A/B, NP SWAB IN TRANSPORT MEDIA 1 HR TAT - Abnormal; Notable for the following components:       Result Value    COVID19 Detected (*)     All other components within normal limits    Narrative:     " Fact sheet for providers: https://www.fda.gov/media/637360/download    Fact sheet for patients: https://www.fda.gov/media/743068/download    Test performed by PCR.  Influenza A and Influenza B negative results should be considered presumptive in samples that have a positive SARS-CoV-2 result.    Competitive inhibition studies showed that SARS-CoV-2 virus, when present at concentrations above 3.6E+04 copies/mL, can inhibit the detection and amplification of influenza A and influenza B virus RNA if present at or below 1.8E+02 copies/mL or 4.9E+02 copies/mL, respectively, and may lead to false negative influenza virus results. If co-infection with influenza A or influenza B virus is suspected in samples with a positive SARS-CoV-2 result, the sample should be re-tested with another FDA cleared, approved, or authorized influenza test, if influenza virus detection would change clinical management.     No orders to display                                            Medical Decision Making  Appropriate PPE was worn during exam and throughout all encounters with the patient.  Due to significant overcrowding in the emergency department patient was evaluated by myself in a hallway bed.  This exam may be limited by privacy, noise, and the patient not wearing a hospital gown.  Explained to the patient our limitations in overcrowding.  They were in agreement to continue the exam and treatment at this time.    Patient presented to the ED with upper respiratory complaints.  Patient son has similar symptoms.  Nasal swab was significant for COVID-19 patient is not hypoxic while in the ED.  She remained well-appearing throughout ED stay.  She does qualify for Paxlovid secondary to BMI and history of asthma.  Drug interaction check was done with pharmacy.  She was advised to hold Advair until completion of Paxlovid.  Upon reassessment patient is resting quietly findings were discussed with the patient and family at bedside who voiced  understanding of discharge along with signs and symptoms to return.  They were in agreement with plan all questions were answered.    This document is intended for medical expert use only. Reading of this document by patients and/or patient's family without participating medical staff guidance may result in misinterpretation and unintended morbidity.  Any interpretation of such data is the responsibility of the patient and/or family member responsible for the patient in concert with their primary or specialist providers, not to be left for sources of online searches such as [employer], [employer] or similar queries. Relying on these approaches to knowledge may result in misinterpretation, misguided goals of care and even death should patients or family members try recommendations outside of the realm of professional medical care in a supervised inpatient environment.       Problems Addressed:  COVID-19 virus detected: acute illness or injury    Risk  Prescription drug management.        Final diagnoses:   COVID-19 virus detected       ED Disposition  ED Disposition       ED Disposition   Discharge    Condition   Stable    Comment   --               Mirela Jennings, APRN  [address]  [address]  [place] [address] [phone]    Schedule an appointment as soon as possible for a visit in 3 days      [facility] EMERGENCY DEPARTMENT  [facility] [facility] [address] [phone]  Go to   If symptoms worsen         Medication List        New Prescriptions      Nirmatrelvir & Ritonavir (300mg/100mg)  Commonly known as: PAXLOVID  Take 3 tablets by mouth 2 (Two) Times a Day for 5 days.               Where to Get Your Medications        These medications were sent to [facility] [address]  [place], [place] [address] - [phone]  - [phone] FX  [address]  [place] [address]      Phone: [phone]   Nirmatrelvir & Ritonavir (300mg/100mg)            Arelis King PA  05/28/24  3302

## 2024-05-29 NOTE — DISCHARGE INSTRUCTIONS
STOP taking Advair while taking Paxlovid.  You may resume after completion of Paxlovid.     Continue albuterol inhaler as needed for shortness of breath.  Drink plenty of clear fluids and get plenty of rest.  Tylenol or ibuprofen as needed for fever or pain.    Follow-up with your primary care provider in 3-5 days.  If you do not have a primary care provider call [phone] for help in finding one, or you may follow up with [facility] at [phone].    Return to ED for any new or worsening symptoms

## 2024-07-09 ENCOUNTER — APPOINTMENT (OUTPATIENT)
Dept: CT IMAGING | Facility: HOSPITAL | Age: 24
End: 2024-07-09
Payer: MEDICAID

## 2024-07-09 ENCOUNTER — HOSPITAL ENCOUNTER (EMERGENCY)
Facility: HOSPITAL | Age: 24
Discharge: LEFT BEFORE DISCHARGE | End: 2024-07-10
Attending: EMERGENCY MEDICINE
Payer: MEDICAID

## 2024-07-09 LAB
ALBUMIN SERPL-MCNC: 4.3 G/DL (ref 3.5–5.2)
ALBUMIN/GLOB SERPL: 1.1 G/DL
ALP SERPL-CCNC: 89 U/L (ref 39–117)
ALT SERPL W P-5'-P-CCNC: 30 U/L (ref 1–33)
ANION GAP SERPL CALCULATED.3IONS-SCNC: 10.2 MMOL/L (ref 5–15)
AST SERPL-CCNC: 22 U/L (ref 1–32)
BASOPHILS # BLD AUTO: 0.03 10*3/MM3 (ref 0–0.2)
BASOPHILS NFR BLD AUTO: 0.4 % (ref 0–1.5)
BILIRUB SERPL-MCNC: 0.2 MG/DL (ref 0–1.2)
BUN SERPL-MCNC: 12 MG/DL (ref 6–20)
BUN/CREAT SERPL: 18.8 (ref 7–25)
CALCIUM SPEC-SCNC: 9.6 MG/DL (ref 8.6–10.5)
CHLORIDE SERPL-SCNC: 102 MMOL/L (ref 98–107)
CO2 SERPL-SCNC: 25.8 MMOL/L (ref 22–29)
CREAT SERPL-MCNC: 0.64 MG/DL (ref 0.57–1)
D DIMER PPP FEU-MCNC: 0.94 MG/L (FEU) (ref 0–0.5)
DEPRECATED RDW RBC AUTO: 41.5 FL (ref 37–54)
EGFRCR SERPLBLD CKD-EPI 2021: 127.5 ML/MIN/1.73
EOSINOPHIL # BLD AUTO: 0.2 10*3/MM3 (ref 0–0.4)
EOSINOPHIL NFR BLD AUTO: 2.9 % (ref 0.3–6.2)
ERYTHROCYTE [DISTWIDTH] IN BLOOD BY AUTOMATED COUNT: 12.9 % (ref 12.3–15.4)
FLUAV RNA RESP QL NAA+PROBE: NOT DETECTED
FLUBV RNA RESP QL NAA+PROBE: NOT DETECTED
GEN 5 2HR TROPONIN T REFLEX: <6 NG/L
GLOBULIN UR ELPH-MCNC: 3.8 GM/DL
GLUCOSE SERPL-MCNC: 97 MG/DL (ref 65–99)
HCG SERPL QL: NEGATIVE
HCT VFR BLD AUTO: 42.4 % (ref 34–46.6)
HGB BLD-MCNC: 13.5 G/DL (ref 12–15.9)
IMM GRANULOCYTES # BLD AUTO: 0.02 10*3/MM3 (ref 0–0.05)
IMM GRANULOCYTES NFR BLD AUTO: 0.3 % (ref 0–0.5)
LYMPHOCYTES # BLD AUTO: 2.61 10*3/MM3 (ref 0.7–3.1)
LYMPHOCYTES NFR BLD AUTO: 38 % (ref 19.6–45.3)
MCH RBC QN AUTO: 28.1 PG (ref 26.6–33)
MCHC RBC AUTO-ENTMCNC: 31.8 G/DL (ref 31.5–35.7)
MCV RBC AUTO: 88.3 FL (ref 79–97)
MONOCYTES # BLD AUTO: 0.49 10*3/MM3 (ref 0.1–0.9)
MONOCYTES NFR BLD AUTO: 7.1 % (ref 5–12)
NEUTROPHILS NFR BLD AUTO: 3.52 10*3/MM3 (ref 1.7–7)
NEUTROPHILS NFR BLD AUTO: 51.3 % (ref 42.7–76)
NRBC BLD AUTO-RTO: 0 /100 WBC (ref 0–0.2)
PLATELET # BLD AUTO: 355 10*3/MM3 (ref 140–450)
PMV BLD AUTO: 9.9 FL (ref 6–12)
POTASSIUM SERPL-SCNC: 4.1 MMOL/L (ref 3.5–5.2)
PROT SERPL-MCNC: 8.1 G/DL (ref 6–8.5)
RBC # BLD AUTO: 4.8 10*6/MM3 (ref 3.77–5.28)
RSV RNA RESP QL NAA+PROBE: NOT DETECTED
SARS-COV-2 RNA RESP QL NAA+PROBE: NOT DETECTED
SODIUM SERPL-SCNC: 138 MMOL/L (ref 136–145)
TROPONIN T DELTA: NORMAL
TROPONIN T SERPL HS-MCNC: <6 NG/L
WBC NRBC COR # BLD AUTO: 6.87 10*3/MM3 (ref 3.4–10.8)

## 2024-07-09 PROCEDURE — 25010000002 DIPHENHYDRAMINE PER 50 MG: Performed by: EMERGENCY MEDICINE

## 2024-07-09 PROCEDURE — 93005 ELECTROCARDIOGRAM TRACING: CPT | Performed by: EMERGENCY MEDICINE

## 2024-07-09 PROCEDURE — 70450 CT HEAD/BRAIN W/O DYE: CPT

## 2024-07-09 PROCEDURE — 84703 CHORIONIC GONADOTROPIN ASSAY: CPT | Performed by: NURSE PRACTITIONER

## 2024-07-09 PROCEDURE — 85025 COMPLETE CBC W/AUTO DIFF WBC: CPT | Performed by: NURSE PRACTITIONER

## 2024-07-09 PROCEDURE — 87637 SARSCOV2&INF A&B&RSV AMP PRB: CPT | Performed by: NURSE PRACTITIONER

## 2024-07-09 PROCEDURE — 96375 TX/PRO/DX INJ NEW DRUG ADDON: CPT

## 2024-07-09 PROCEDURE — 99284 EMERGENCY DEPT VISIT MOD MDM: CPT

## 2024-07-09 PROCEDURE — 93005 ELECTROCARDIOGRAM TRACING: CPT

## 2024-07-09 PROCEDURE — 36415 COLL VENOUS BLD VENIPUNCTURE: CPT

## 2024-07-09 PROCEDURE — 80053 COMPREHEN METABOLIC PANEL: CPT | Performed by: NURSE PRACTITIONER

## 2024-07-09 PROCEDURE — 25810000003 SODIUM CHLORIDE 0.9 % SOLUTION: Performed by: EMERGENCY MEDICINE

## 2024-07-09 PROCEDURE — 25010000002 PROCHLORPERAZINE 10 MG/2ML SOLUTION: Performed by: EMERGENCY MEDICINE

## 2024-07-09 PROCEDURE — 84484 ASSAY OF TROPONIN QUANT: CPT | Performed by: EMERGENCY MEDICINE

## 2024-07-09 PROCEDURE — 96374 THER/PROPH/DIAG INJ IV PUSH: CPT

## 2024-07-09 PROCEDURE — 85379 FIBRIN DEGRADATION QUANT: CPT | Performed by: EMERGENCY MEDICINE

## 2024-07-09 RX ORDER — DIPHENHYDRAMINE HYDROCHLORIDE 50 MG/ML
25 INJECTION INTRAMUSCULAR; INTRAVENOUS ONCE
Status: COMPLETED | OUTPATIENT
Start: 2024-07-09 | End: 2024-07-09

## 2024-07-09 RX ORDER — SODIUM CHLORIDE 0.9 % (FLUSH) 0.9 %
10 SYRINGE (ML) INJECTION AS NEEDED
Status: DISCONTINUED | OUTPATIENT
Start: 2024-07-09 | End: 2024-07-10 | Stop reason: HOSPADM

## 2024-07-09 RX ORDER — PROCHLORPERAZINE EDISYLATE 5 MG/ML
10 INJECTION INTRAMUSCULAR; INTRAVENOUS ONCE
Status: COMPLETED | OUTPATIENT
Start: 2024-07-09 | End: 2024-07-09

## 2024-07-09 RX ADMIN — DIPHENHYDRAMINE HYDROCHLORIDE 25 MG: 50 INJECTION, SOLUTION INTRAMUSCULAR; INTRAVENOUS at 23:03

## 2024-07-09 RX ADMIN — SODIUM CHLORIDE 1000 ML: 9 INJECTION, SOLUTION INTRAVENOUS at 23:03

## 2024-07-09 RX ADMIN — PROCHLORPERAZINE EDISYLATE 10 MG: 5 INJECTION INTRAMUSCULAR; INTRAVENOUS at 23:03

## 2024-07-09 NOTE — ED TRIAGE NOTES
Pt arrived via PV c/o intermittent upper back that radiates to her chest and neck. Pt reports symptoms for 1-2 weeks.

## 2024-07-09 NOTE — ED PROVIDER NOTES
"Subjective     Provider in Triage Note  Patient states that twice a day she gets intense pain that starts in her upper back and radiates into her neck/chest and into her head.  She states that it lasts about 2 or 3 hours-and then dissipates    She states it feels like she is having a migraine but normally they last for several hours and this is only lasting for 2 to 3 hours.    She denies any fever chills cough congestion injury or fall.    Due to significant overcrowding in the emergency department patient was initially seen and evaluated in triage.  Provider in triage recommended patient placement in the treatment area to initiate therapy and movement to an ER bed as soon as possible.    History of Present Illness  Chief complaint: I reviewed the provider in triage notes.  Patient is 24-year-old.  For last week she has been having episodes of recurrent back pain in the mid scapular area.  Radiates up into her neck and head and through her chest.  This happens twice a day for the last 10 days or so.  The last 3 to 4 hours and goes away.  Inevitably turns in a \"migraine\".  She has no focal neurologic symptoms with it.  She is never anything like it before presents here.  She did have COVID just over a month ago.  She is never had a blood clot in her leg or lungs.  No swelling in her legs.  She did abruptly stop venlafaxine a week and a half ago as well.    Context:    Duration:    Timing:    Severity: Severe    Associated Symptoms:        PCP:  LMP:      Review of Systems   Constitutional:  Negative for fever.   Respiratory: Negative.     Cardiovascular:  Positive for chest pain.   Musculoskeletal:  Positive for back pain.   Neurological:  Positive for headaches.       Past Medical History:   Diagnosis Date    Abnormal TSH     Alternating constipation and diarrhea     Impression: check labs as below. Most likely IBS. Discussed diet and things to avoid. Start probiotics. F/U in 4 days    Anxiety     Arthralgia of " both hands     Impression: Rheumatoid labs negative. She may need to see rheumatologist.    Asthma     Breast lump     Impression: u/s was negative last year. Exam negative again. She says she has not felt it either.    Depression, major, recurrent, mild     Impression: She is seeing a counsler. She is going to take zoloft 1/2 a tab unless she needs the 100mg. No SI or HI    Enlarged thyroid     Impression: recheck tsh    Migraine without intractable migraine     Common migraine without mention of intractable migraine; Impression: Discussed if there is loss of vision in an eye, confusion, weakness or numbness in an extremity, drooping of a side of the face, intractible pain, intractible vomiting, to go to the ER. much better    Mild intermittent asthma, uncomplicated     Impression: refill rescue and add singulair. Diagnosis, treatment and course discussed. Discussed medication, dosing and adverse effects. Return if there is worsening or persistance of symptoms    Wears glasses        Allergies   Allergen Reactions    Cephalexin Hives       Past Surgical History:   Procedure Laterality Date    NO PAST SURGERIES         Family History   Problem Relation Age of Onset    Other Mother         psychiatric condition    Cervical cancer Mother     Hypertension Mother     Hyperlipidemia Maternal Grandmother     Hypertension Maternal Grandmother     Diabetes Maternal Grandmother         Diabetes Mellitus    Coronary artery disease Maternal Grandmother     Cancer Maternal Great-Grandmother        Social History     Socioeconomic History    Marital status: Single    Number of children: 2    Highest education level: High school graduate   Tobacco Use    Smoking status: Never    Smokeless tobacco: Never   Vaping Use    Vaping status: Never Used   Substance and Sexual Activity    Alcohol use: Yes     Comment: Rarely    Drug use: No    Sexual activity: Yes     Partners: Male     Birth control/protection: Birth control pill            Objective   Physical Exam  Vitals and nursing note reviewed.   Constitutional:       Appearance: Normal appearance.   HENT:      Head: Normocephalic and atraumatic.   Eyes:      Extraocular Movements: Extraocular movements intact.      Pupils: Pupils are equal, round, and reactive to light.   Cardiovascular:      Rate and Rhythm: Normal rate and regular rhythm.      Heart sounds: Normal heart sounds.   Pulmonary:      Effort: Pulmonary effort is normal.      Breath sounds: Normal breath sounds.   Abdominal:      Tenderness: There is no abdominal tenderness.   Musculoskeletal:         General: Normal range of motion.      Cervical back: Normal range of motion and neck supple.   Skin:     General: Skin is warm and dry.   Neurological:      General: No focal deficit present.      Mental Status: She is alert and oriented to person, place, and time.      Cranial Nerves: No cranial nerve deficit.      Sensory: No sensory deficit.      Motor: No weakness.      Coordination: Coordination normal.   Psychiatric:         Mood and Affect: Mood normal.         Thought Content: Thought content normal.         Procedures           ED Course  ED Course as of 07/09/24 2131 Tue Jul 09, 2024 2108 Marked ready for CT [KW]      ED Course User Index  [KW] Maira Castillo, APRN      Results for orders placed or performed during the hospital encounter of 07/09/24   COVID-19, FLU A/B, RSV PCR 1 HR TAT - Swab, Nasopharynx    Specimen: Nasopharynx; Swab   Result Value Ref Range    COVID19 Not Detected Not Detected - Ref. Range    Influenza A PCR Not Detected Not Detected    Influenza B PCR Not Detected Not Detected    RSV, PCR Not Detected Not Detected   Comprehensive Metabolic Panel    Specimen: Blood   Result Value Ref Range    Glucose 97 65 - 99 mg/dL    BUN 12 6 - 20 mg/dL    Creatinine 0.64 0.57 - 1.00 mg/dL    Sodium 138 136 - 145 mmol/L    Potassium 4.1 3.5 - 5.2 mmol/L    Chloride 102 98 - 107 mmol/L    CO2 25.8  22.0 - 29.0 mmol/L    Calcium 9.6 8.6 - 10.5 mg/dL    Total Protein 8.1 6.0 - 8.5 g/dL    Albumin 4.3 3.5 - 5.2 g/dL    ALT (SGPT) 30 1 - 33 U/L    AST (SGOT) 22 1 - 32 U/L    Alkaline Phosphatase 89 39 - 117 U/L    Total Bilirubin 0.2 0.0 - 1.2 mg/dL    Globulin 3.8 gm/dL    A/G Ratio 1.1 g/dL    BUN/Creatinine Ratio 18.8 7.0 - 25.0    Anion Gap 10.2 5.0 - 15.0 mmol/L    eGFR 127.5 >60.0 mL/min/1.73   CBC Auto Differential    Specimen: Blood   Result Value Ref Range    WBC 6.87 3.40 - 10.80 10*3/mm3    RBC 4.80 3.77 - 5.28 10*6/mm3    Hemoglobin 13.5 12.0 - 15.9 g/dL    Hematocrit 42.4 34.0 - 46.6 %    MCV 88.3 79.0 - 97.0 fL    MCH 28.1 26.6 - 33.0 pg    MCHC 31.8 31.5 - 35.7 g/dL    RDW 12.9 12.3 - 15.4 %    RDW-SD 41.5 37.0 - 54.0 fl    MPV 9.9 6.0 - 12.0 fL    Platelets 355 140 - 450 10*3/mm3    Neutrophil % 51.3 42.7 - 76.0 %    Lymphocyte % 38.0 19.6 - 45.3 %    Monocyte % 7.1 5.0 - 12.0 %    Eosinophil % 2.9 0.3 - 6.2 %    Basophil % 0.4 0.0 - 1.5 %    Immature Grans % 0.3 0.0 - 0.5 %    Neutrophils, Absolute 3.52 1.70 - 7.00 10*3/mm3    Lymphocytes, Absolute 2.61 0.70 - 3.10 10*3/mm3    Monocytes, Absolute 0.49 0.10 - 0.90 10*3/mm3    Eosinophils, Absolute 0.20 0.00 - 0.40 10*3/mm3    Basophils, Absolute 0.03 0.00 - 0.20 10*3/mm3    Immature Grans, Absolute 0.02 0.00 - 0.05 10*3/mm3    nRBC 0.0 0.0 - 0.2 /100 WBC   hCG, Serum, Qualitative    Specimen: Blood   Result Value Ref Range    HCG Qualitative Negative Negative   D-dimer, Quantitative    Specimen: Blood   Result Value Ref Range    D-Dimer, Quantitative 0.94 (H) 0.00 - 0.50 mg/L (FEU)   High Sensitivity Troponin T    Specimen: Blood   Result Value Ref Range    HS Troponin T <6 <14 ng/L   High Sensitivity Troponin T 2Hr    Specimen: Blood   Result Value Ref Range    HS Troponin T <6 <14 ng/L    Troponin T Delta     ECG 12 Lead Chest Pain   Result Value Ref Range    QT Interval 349 ms    QTC Interval 439 ms                                            Medical Decision Making  Patient was seen evaluated for above problem    Differential diagnosis includes was not limited to dissection, PE, intracerebral hemorrhage, medication withdrawal    Patient had abruptly stopped her venlafaxine and could be having withdrawal symptoms.  However with back pain straight through to her chest and recent COVID was concern for cardiopulmonary emergencies as well.  CT head shows no acute finding.  She has no neurologic compromise.  I did order CT chest pulmonary embolism after her D-dimer was elevated.  However she did decide to sign out AGAINST MEDICAL ADVICE.  She understands the risks up to including death.  She understands she can return at any point in time for reevaluation.    Amount and/or Complexity of Data Reviewed  Labs: ordered. Decision-making details documented in ED Course.     Details: Labs reviewed by myself  Radiology: ordered and independent interpretation performed.     Details: CT brain reviewed myself as above  ECG/medicine tests: ordered and independent interpretation performed.     Details: EKG interpreted by myself sinus rhythm rate 95.    Risk  Prescription drug management.        Final diagnoses:   None   Chest pain  Headache  AMA    ED Disposition  ED Disposition       None            No follow-up provider specified.       Medication List      No changes were made to your prescriptions during this visit.            Doron Leo DO  07/12/24 5130

## 2024-07-10 ENCOUNTER — APPOINTMENT (OUTPATIENT)
Dept: CT IMAGING | Facility: HOSPITAL | Age: 24
End: 2024-07-10
Payer: MEDICAID

## 2024-07-10 ENCOUNTER — HOSPITAL ENCOUNTER (EMERGENCY)
Facility: HOSPITAL | Age: 24
Discharge: HOME | End: 2024-07-10
Attending: EMERGENCY MEDICINE
Payer: MEDICAID

## 2024-07-10 VITALS
WEIGHT: 293 LBS | RESPIRATION RATE: 18 BRPM | SYSTOLIC BLOOD PRESSURE: 129 MMHG | TEMPERATURE: 98.3 F | BODY MASS INDEX: 45.99 KG/M2 | DIASTOLIC BLOOD PRESSURE: 85 MMHG | OXYGEN SATURATION: 98 % | HEART RATE: 114 BPM | HEIGHT: 67 IN

## 2024-07-10 VITALS
SYSTOLIC BLOOD PRESSURE: 124 MMHG | OXYGEN SATURATION: 96 % | TEMPERATURE: 98.4 F | RESPIRATION RATE: 16 BRPM | HEIGHT: 67 IN | BODY MASS INDEX: 45.99 KG/M2 | DIASTOLIC BLOOD PRESSURE: 71 MMHG | WEIGHT: 293 LBS | HEART RATE: 89 BPM

## 2024-07-10 DIAGNOSIS — R79.89 POSITIVE D DIMER: Primary | ICD-10-CM

## 2024-07-10 DIAGNOSIS — R07.9 CHEST PAIN, UNSPECIFIED TYPE: ICD-10-CM

## 2024-07-10 LAB
ALBUMIN SERPL-MCNC: 4.3 G/DL (ref 3.5–5.2)
ALBUMIN/GLOB SERPL: 1.2 G/DL
ALP SERPL-CCNC: 87 U/L (ref 39–117)
ALT SERPL W P-5'-P-CCNC: 28 U/L (ref 1–33)
ANION GAP SERPL CALCULATED.3IONS-SCNC: 10.8 MMOL/L (ref 5–15)
AST SERPL-CCNC: 20 U/L (ref 1–32)
BASOPHILS # BLD AUTO: 0.04 10*3/MM3 (ref 0–0.2)
BASOPHILS NFR BLD AUTO: 0.5 % (ref 0–1.5)
BILIRUB SERPL-MCNC: 0.2 MG/DL (ref 0–1.2)
BUN SERPL-MCNC: 14 MG/DL (ref 6–20)
BUN/CREAT SERPL: 21.2 (ref 7–25)
CALCIUM SPEC-SCNC: 9.1 MG/DL (ref 8.6–10.5)
CHLORIDE SERPL-SCNC: 103 MMOL/L (ref 98–107)
CO2 SERPL-SCNC: 25.2 MMOL/L (ref 22–29)
CREAT SERPL-MCNC: 0.66 MG/DL (ref 0.57–1)
D DIMER PPP FEU-MCNC: 0.57 MG/L (FEU) (ref 0–0.5)
DEPRECATED RDW RBC AUTO: 42.5 FL (ref 37–54)
EGFRCR SERPLBLD CKD-EPI 2021: 126.6 ML/MIN/1.73
EOSINOPHIL # BLD AUTO: 0.21 10*3/MM3 (ref 0–0.4)
EOSINOPHIL NFR BLD AUTO: 2.8 % (ref 0.3–6.2)
ERYTHROCYTE [DISTWIDTH] IN BLOOD BY AUTOMATED COUNT: 12.9 % (ref 12.3–15.4)
GLOBULIN UR ELPH-MCNC: 3.6 GM/DL
GLUCOSE SERPL-MCNC: 108 MG/DL (ref 65–99)
HCT VFR BLD AUTO: 40.6 % (ref 34–46.6)
HGB BLD-MCNC: 13.2 G/DL (ref 12–15.9)
IMM GRANULOCYTES # BLD AUTO: 0.01 10*3/MM3 (ref 0–0.05)
IMM GRANULOCYTES NFR BLD AUTO: 0.1 % (ref 0–0.5)
LYMPHOCYTES # BLD AUTO: 3.13 10*3/MM3 (ref 0.7–3.1)
LYMPHOCYTES NFR BLD AUTO: 42.1 % (ref 19.6–45.3)
MCH RBC QN AUTO: 29.1 PG (ref 26.6–33)
MCHC RBC AUTO-ENTMCNC: 32.5 G/DL (ref 31.5–35.7)
MCV RBC AUTO: 89.6 FL (ref 79–97)
MONOCYTES # BLD AUTO: 0.48 10*3/MM3 (ref 0.1–0.9)
MONOCYTES NFR BLD AUTO: 6.5 % (ref 5–12)
NEUTROPHILS NFR BLD AUTO: 3.56 10*3/MM3 (ref 1.7–7)
NEUTROPHILS NFR BLD AUTO: 48 % (ref 42.7–76)
NRBC BLD AUTO-RTO: 0 /100 WBC (ref 0–0.2)
PLATELET # BLD AUTO: 313 10*3/MM3 (ref 140–450)
PMV BLD AUTO: 10 FL (ref 6–12)
POTASSIUM SERPL-SCNC: 3.9 MMOL/L (ref 3.5–5.2)
PROT SERPL-MCNC: 7.9 G/DL (ref 6–8.5)
QT INTERVAL: 349 MS
QTC INTERVAL: 439 MS
RBC # BLD AUTO: 4.53 10*6/MM3 (ref 3.77–5.28)
SODIUM SERPL-SCNC: 139 MMOL/L (ref 136–145)
WBC NRBC COR # BLD AUTO: 7.43 10*3/MM3 (ref 3.4–10.8)

## 2024-07-10 PROCEDURE — 85379 FIBRIN DEGRADATION QUANT: CPT | Performed by: NURSE PRACTITIONER

## 2024-07-10 PROCEDURE — 85025 COMPLETE CBC W/AUTO DIFF WBC: CPT | Performed by: NURSE PRACTITIONER

## 2024-07-10 PROCEDURE — 96374 THER/PROPH/DIAG INJ IV PUSH: CPT

## 2024-07-10 PROCEDURE — 25510000001 IOPAMIDOL PER 1 ML: Performed by: EMERGENCY MEDICINE

## 2024-07-10 PROCEDURE — 80053 COMPREHEN METABOLIC PANEL: CPT | Performed by: NURSE PRACTITIONER

## 2024-07-10 PROCEDURE — 71275 CT ANGIOGRAPHY CHEST: CPT

## 2024-07-10 PROCEDURE — 93005 ELECTROCARDIOGRAM TRACING: CPT

## 2024-07-10 PROCEDURE — 25810000003 SODIUM CHLORIDE 0.9 % SOLUTION

## 2024-07-10 PROCEDURE — 99285 EMERGENCY DEPT VISIT HI MDM: CPT

## 2024-07-10 PROCEDURE — 25010000002 KETOROLAC TROMETHAMINE PER 15 MG

## 2024-07-10 RX ORDER — SODIUM CHLORIDE 0.9 % (FLUSH) 0.9 %
10 SYRINGE (ML) INJECTION AS NEEDED
Status: DISCONTINUED | OUTPATIENT
Start: 2024-07-10 | End: 2024-07-11 | Stop reason: HOSPADM

## 2024-07-10 RX ORDER — KETOROLAC TROMETHAMINE 30 MG/ML
30 INJECTION, SOLUTION INTRAMUSCULAR; INTRAVENOUS ONCE
Status: COMPLETED | OUTPATIENT
Start: 2024-07-10 | End: 2024-07-10

## 2024-07-10 RX ORDER — DICLOFENAC SODIUM 75 MG/1
75 TABLET, DELAYED RELEASE ORAL 2 TIMES DAILY PRN
Qty: 20 TABLET | Refills: 0 | Status: SHIPPED | OUTPATIENT
Start: 2024-07-10

## 2024-07-10 RX ORDER — METHOCARBAMOL 750 MG/1
750 TABLET, FILM COATED ORAL 3 TIMES DAILY PRN
Qty: 15 TABLET | Refills: 0 | Status: SHIPPED | OUTPATIENT
Start: 2024-07-10

## 2024-07-10 RX ADMIN — SODIUM CHLORIDE 500 ML: 9 INJECTION, SOLUTION INTRAVENOUS at 21:05

## 2024-07-10 RX ADMIN — KETOROLAC TROMETHAMINE 30 MG: 30 INJECTION, SOLUTION INTRAMUSCULAR at 21:06

## 2024-07-10 RX ADMIN — IOPAMIDOL 100 ML: 755 INJECTION, SOLUTION INTRAVENOUS at 22:21

## 2024-07-10 NOTE — Clinical Note
[facility] EMERGENCY DEPARTMENT  [address] [place] [address]  Phone: [phone]    Glory Boo was seen and treated in our emergency department on 7/10/2024.  She may return to work on 07/12/2024.         Thank you for choosing [facility].    Kathie Mcdonough RN

## 2024-07-11 LAB
QT INTERVAL: 346 MS
QTC INTERVAL: 448 MS

## 2024-07-11 NOTE — DISCHARGE INSTRUCTIONS
Use Robaxin as needed for muscle relaxation and diclofenac as needed for pain    Establish care with primary care and follow-up as soon as possible for any further problems or return to the ER if worse

## 2024-07-11 NOTE — ED PROVIDER NOTES
Subjective   History of Present Illness  Patient is a 24-year-old female that presents the ED complaining of chest discomfort, back pain, neck pain, headache that started about a week ago.  States that she was seen here last night and was told she had an elevated D-dimer was supposed to get a CT PE however had to go  her kids.  She states at time of assessment that she has mild discomfort in her chest and back along with a headache.  Denies any fevers, abdominal pain, nausea, vomiting.  She does not smoke and taking her birth control in May of this year    PCP: none        Review of Systems   Cardiovascular:  Positive for chest pain.   Musculoskeletal:  Positive for back pain and neck pain. Negative for neck stiffness.   Neurological:  Positive for headaches.       Past Medical History:   Diagnosis Date    Abnormal TSH     Alternating constipation and diarrhea     Impression: check labs as below. Most likely IBS. Discussed diet and things to avoid. Start probiotics. F/U in 4 days    Anxiety     Arthralgia of both hands     Impression: Rheumatoid labs negative. She may need to see rheumatologist.    Asthma     Breast lump     Impression: u/s was negative last year. Exam negative again. She says she has not felt it either.    Depression, major, recurrent, mild     Impression: She is seeing a counsler. She is going to take zoloft 1/2 a tab unless she needs the 100mg. No SI or HI    Enlarged thyroid     Impression: recheck tsh    Migraine without intractable migraine     Common migraine without mention of intractable migraine; Impression: Discussed if there is loss of vision in an eye, confusion, weakness or numbness in an extremity, drooping of a side of the face, intractible pain, intractible vomiting, to go to the ER. much better    Mild intermittent asthma, uncomplicated     Impression: refill rescue and add singulair. Diagnosis, treatment and course discussed. Discussed medication, dosing and adverse  effects. Return if there is worsening or persistance of symptoms    Wears glasses        Allergies   Allergen Reactions    Cephalexin Hives       Past Surgical History:   Procedure Laterality Date    NO PAST SURGERIES         Family History   Problem Relation Age of Onset    Other Mother         psychiatric condition    Cervical cancer Mother     Hypertension Mother     Hyperlipidemia Maternal Grandmother     Hypertension Maternal Grandmother     Diabetes Maternal Grandmother         Diabetes Mellitus    Coronary artery disease Maternal Grandmother     Cancer Maternal Great-Grandmother        Social History     Socioeconomic History    Marital status: Single    Number of children: 2    Highest education level: High school graduate   Tobacco Use    Smoking status: Never    Smokeless tobacco: Never   Vaping Use    Vaping status: Never Used   Substance and Sexual Activity    Alcohol use: Yes     Comment: Rarely    Drug use: No    Sexual activity: Yes     Partners: Male     Birth control/protection: Birth control pill           Objective   Physical Exam  HENT:      Head: Normocephalic and atraumatic.   Eyes:      Extraocular Movements: Extraocular movements intact.      Conjunctiva/sclera: Conjunctivae normal.   Neck:      Comments: Patient has full range of motion of the neck  Cardiovascular:      Rate and Rhythm: Normal rate and regular rhythm.      Pulses: Normal pulses.      Heart sounds: Normal heart sounds.   Pulmonary:      Effort: Pulmonary effort is normal.      Breath sounds: Normal breath sounds. No wheezing.   Abdominal:      General: Bowel sounds are normal.      Palpations: Abdomen is soft.      Tenderness: There is no abdominal tenderness.   Musculoskeletal:      Cervical back: Normal range of motion. No rigidity.   Neurological:      Mental Status: She is alert.   Psychiatric:         Mood and Affect: Mood normal.         Behavior: Behavior normal.         Thought Content: Thought content normal.          "Judgment: Judgment normal.         Procedures           ED Course  ED Course as of 07/10/24 2239   Wed Jul 10, 2024   1953 Marked ready for CT   [KB]   2100 Care hand off given to Smitha COFFEY regarding disposition after CT PE [KB]   2129 Waiting to go to CAT scan [KW]   2137 Patient in CAT scan now [KW]   2216 Waiting for the CAT scan to be read [KW]      ED Course User Index  [KB] Merrill Curiel APRN  [KW] Maira Castillo APRN    /96   Pulse 100   Temp 98.5 °F (36.9 °C) (Oral)   Resp 16   Ht 170.2 cm (67\")   Wt 135 kg (297 lb 9.9 oz)   LMP  (LMP Unknown) Comment: stopped in May.  SpO2 100%   BMI 46.61 kg/m²   Labs Reviewed   COMPREHENSIVE METABOLIC PANEL - Abnormal; Notable for the following components:       Result Value    Glucose 108 (*)     All other components within normal limits    Narrative:     GFR Normal >60  Chronic Kidney Disease <60  Kidney Failure <15     D-DIMER, QUANTITATIVE - Abnormal; Notable for the following components:    D-Dimer, Quantitative 0.57 (*)     All other components within normal limits    Narrative:     According to the assay 's published package insert, a normal (<0.50 mg/L (FEU)) D-dimer result in conjunction with a non-high clinical probability assessment, excludes deep vein thrombosis (DVT) and pulmonary embolism (PE) with high sensitivity.    D-dimer values increase with age and this can make VTE exclusion of an older population difficult. To address this, the [employer], based on best available evidence and recent guidelines, recommends that clinicians use age-adjusted D-dimer thresholds in patients greater than 50 years of age with: a) a low probability of PE who do not meet all Pulmonary Embolism Rule Out Criteria, or b) in those with intermediate probability of PE.   The formula for an age-adjusted D-dimer cut-off is \"age/100\".  For example, a 60 year old patient would have an age-adjusted cut-off of 0.60 mg/L (FEU) and " an 80 year old 0.80 mg/L (FEU).   CBC WITH AUTO DIFFERENTIAL - Abnormal; Notable for the following components:    Lymphocytes, Absolute 3.13 (*)     All other components within normal limits   CBC AND DIFFERENTIAL    Narrative:     The following orders were created for panel order CBC & Differential.  Procedure                               Abnormality         Status                     ---------                               -----------         ------                     CBC Auto Differential[[id number]]        Abnormal            Final result                 Please view results for these tests on the individual orders.     Medications   sodium chloride 0.9 % flush 10 mL (has no administration in time range)   ketorolac (TORADOL) injection 30 mg (30 mg Intravenous Given 7/10/24 2106)   sodium chloride 0.9 % bolus 500 mL (500 mL Intravenous New Bag 7/10/24 2105)   iopamidol (ISOVUE-370) 76 % injection 100 mL (100 mL Intravenous Given 7/10/24 2221)     CT Angiogram Chest Pulmonary Embolism    Result Date: 7/10/2024  Impression: No pulmonary embolism. No acute intrathoracic abnormality. Electronically Signed: Heber Edge DO  7/10/2024 10:26 PM EDT  Workstation ID: [id number]    CT Head Without Contrast    Result Date: 7/9/2024  Impression: No acute intracranial pathology. Electronically Signed: Solomon Grigsby MD  7/9/2024 9:39 PM EDT  Workstation ID: [id number]                                            Medical Decision Making  Patient is a 24-year-old obese female who came in last night for head neck and back pain and was worked up thoroughly with a full CBC and chemistry but did have a positive D-dimer she had to leave at that time to take care of her children but she came back tonight and had another positive D-dimer but it was 0.9 last night and 0.59 tonight.  IV was established and blood work was obtained another CBC and chemistry was performed and found to be within normal limits tonight.  She had a subsequent  CT PE protocol which showed no pulmonary emboli no other abnormalities the patient will be sent home with Robaxin and Voltaren and advised to follow-up with primary care and to return if worse she verbalized understood discharge instructions    Problems Addressed:  Chest pain, unspecified type: complicated acute illness or injury  Positive D dimer: complicated acute illness or injury    Amount and/or Complexity of Data Reviewed  External Data Reviewed: labs, radiology, ECG and notes.     Details: From visit last night  Labs: ordered. Decision-making details documented in ED Course.  Radiology: ordered and independent interpretation performed. Decision-making details documented in ED Course.  ECG/medicine tests: ordered and independent interpretation performed. Decision-making details documented in ED Course.    Risk  OTC drugs.  Prescription drug management.        Final diagnoses:   Positive D dimer   Chest pain, unspecified type       ED Disposition  ED Disposition       ED Disposition   Discharge    Condition   Stable    Comment   --               Janet Nesbitt MD  [address] [facility] [place] [address]  [phone]    In 3 days  As needed, If symptoms worsen    Cleveland Soliman MD  [address]  [place] [address]  [phone]    In 3 days  If symptoms worsen, As needed         Medication List        New Prescriptions      diclofenac 75 MG EC tablet  Commonly known as: VOLTAREN  Take 1 tablet by mouth 2 (Two) Times a Day As Needed (pain).     methocarbamol 750 MG tablet  Commonly known as: ROBAXIN  Take 1 tablet by mouth 3 (Three) Times a Day As Needed for Muscle Spasms.               Where to Get Your Medications        These medications were sent to [facility] [place], [place] - [address] - [phone] [place] [phone] FX  [address]  [place] [address]      Phone: [phone]   diclofenac 75 MG EC tablet  methocarbamol 750 MG tablet            Maira Castillo, APRN  07/10/24  5001

## 2024-07-26 DIAGNOSIS — N64.4 ACUTE BREAST PAIN: Primary | ICD-10-CM

## 2025-02-05 ENCOUNTER — TELEPHONE (OUTPATIENT)
Dept: FAMILY MEDICINE CLINIC | Facility: CLINIC | Age: 25
End: 2025-02-05
Payer: COMMERCIAL

## 2025-02-05 NOTE — TELEPHONE ENCOUNTER
Please call her and let her know she is due a follow up thyroid ultrasound.  She is no longer under my care but I want to make her aware so she can get her new PCP to order this for her.     She had a thyroid ultrasound on 2/28/2024 and she needs annual surveillance of a nodule on left side.

## 2025-02-26 ENCOUNTER — TELEPHONE (OUTPATIENT)
Dept: FAMILY MEDICINE CLINIC | Facility: CLINIC | Age: 25
End: 2025-02-26
Payer: COMMERCIAL

## 2025-02-26 NOTE — TELEPHONE ENCOUNTER
Called and spoke to pt.  Pt states she has not scheduled US but when she does, she will give her new PCP our number to get records

## 2025-02-26 NOTE — TELEPHONE ENCOUNTER
Please call her regarding telephone message on 2/18/2025.    Ask her if she has any questions and ask her if she needs us to fax a copy of thyroid US to her new PCP.